# Patient Record
Sex: FEMALE | Race: OTHER | HISPANIC OR LATINO | Employment: UNEMPLOYED | URBAN - METROPOLITAN AREA
[De-identification: names, ages, dates, MRNs, and addresses within clinical notes are randomized per-mention and may not be internally consistent; named-entity substitution may affect disease eponyms.]

---

## 2021-08-19 ENCOUNTER — HISTORICAL (OUTPATIENT)
Dept: ADMINISTRATIVE | Facility: HOSPITAL | Age: 26
End: 2021-08-19

## 2021-08-19 LAB
ABS NEUT (OLG): 4.81 X10(3)/MCL (ref 2.1–9.2)
ALBUMIN SERPL-MCNC: 4.6 GM/DL (ref 3.5–5)
ALBUMIN/GLOB SERPL: 1.5 RATIO (ref 1.1–2)
ALP SERPL-CCNC: 162 UNIT/L (ref 40–150)
ALT SERPL-CCNC: 18 UNIT/L (ref 0–55)
AST SERPL-CCNC: 14 UNIT/L (ref 5–34)
BASOPHILS # BLD AUTO: 0 X10(3)/MCL (ref 0–0.2)
BASOPHILS NFR BLD AUTO: 0 %
BILIRUB SERPL-MCNC: 0.3 MG/DL
BILIRUBIN DIRECT+TOT PNL SERPL-MCNC: 0.1 MG/DL (ref 0–0.5)
BILIRUBIN DIRECT+TOT PNL SERPL-MCNC: 0.2 MG/DL (ref 0–0.8)
BUN SERPL-MCNC: 11.7 MG/DL (ref 7–18.7)
CALCIUM SERPL-MCNC: 10.6 MG/DL (ref 8.4–10.2)
CHLORIDE SERPL-SCNC: 103 MMOL/L (ref 98–107)
CO2 SERPL-SCNC: 26 MMOL/L (ref 22–29)
CREAT SERPL-MCNC: 0.61 MG/DL (ref 0.55–1.02)
CRP SERPL-MCNC: 0.23 MG/DL
EOSINOPHIL # BLD AUTO: 0.1 X10(3)/MCL (ref 0–0.9)
EOSINOPHIL NFR BLD AUTO: 1 %
ERYTHROCYTE [DISTWIDTH] IN BLOOD BY AUTOMATED COUNT: 13.7 % (ref 11.5–17)
ERYTHROCYTE [SEDIMENTATION RATE] IN BLOOD: 11 MM/HR (ref 0–20)
GLOBULIN SER-MCNC: 3 GM/DL (ref 2.4–3.5)
GLUCOSE SERPL-MCNC: 92 MG/DL (ref 74–100)
HCT VFR BLD AUTO: 39.2 % (ref 37–47)
HGB BLD-MCNC: 12.9 GM/DL (ref 12–16)
LYMPHOCYTES # BLD AUTO: 3.3 X10(3)/MCL (ref 0.6–4.6)
LYMPHOCYTES NFR BLD AUTO: 38 %
MCH RBC QN AUTO: 29.3 PG (ref 27–31)
MCHC RBC AUTO-ENTMCNC: 32.9 GM/DL (ref 33–36)
MCV RBC AUTO: 89.1 FL (ref 80–94)
MONOCYTES # BLD AUTO: 0.6 X10(3)/MCL (ref 0.1–1.3)
MONOCYTES NFR BLD AUTO: 6 %
NEUTROPHILS # BLD AUTO: 4.81 X10(3)/MCL (ref 2.1–9.2)
NEUTROPHILS NFR BLD AUTO: 54 %
PLATELET # BLD AUTO: 303 X10(3)/MCL (ref 130–400)
PMV BLD AUTO: 11.1 FL (ref 9.4–12.4)
POTASSIUM SERPL-SCNC: 3.8 MMOL/L (ref 3.5–5.1)
PROT SERPL-MCNC: 7.6 GM/DL (ref 6.4–8.3)
RBC # BLD AUTO: 4.4 X10(6)/MCL (ref 4.2–5.4)
SODIUM SERPL-SCNC: 141 MMOL/L (ref 136–145)
T3FREE SERPL-MCNC: 2.96 PG/ML (ref 1.58–3.91)
T4 SERPL-MCNC: 7.76 UG/DL (ref 4.87–11.72)
TSH SERPL-ACNC: 0.66 UIU/ML (ref 0.35–4.94)
WBC # SPEC AUTO: 8.9 X10(3)/MCL (ref 4.5–11.5)

## 2022-12-29 ENCOUNTER — TELEPHONE (OUTPATIENT)
Dept: OBSTETRICS AND GYNECOLOGY | Facility: CLINIC | Age: 27
End: 2022-12-29

## 2022-12-29 NOTE — TELEPHONE ENCOUNTER
Phone call returned.   I left a message on [employer]    I will attempt to reach tomorrow morning to schedule a New OB appointment

## 2022-12-29 NOTE — TELEPHONE ENCOUNTER
----- Message from Edel Carvajal sent at 12/29/2022  3:52 PM CST -----  Contact:  Jay Bourgeois  Patient  Jay Yelverton  called for a new pregnancy appt  for Elida Powers  please call [phone] / pt  is waiting for new insurance cards

## 2022-12-30 NOTE — TELEPHONE ENCOUNTER
Phone call returned.  I spoke to Mr Bourgeois.  He is in the middle of a conference call    I will attempt to reach him before noon today.

## 2023-01-03 ENCOUNTER — TELEPHONE (OUTPATIENT)
Dept: OBSTETRICS AND GYNECOLOGY | Facility: CLINIC | Age: 28
End: 2023-01-03

## 2023-01-03 NOTE — TELEPHONE ENCOUNTER
----- Message from Lisbeth Barnard LPN sent at 1/3/2023  2:21 PM CST -----  Contact: Satish ()    ----- Message -----  From: Sue Grimes MA  Sent: 1/3/2023  11:30 AM CST  To: Lisbeth Barnard LPN      ----- Message -----  From: Melissa Nolan  Sent: 1/3/2023  10:39 AM CST  To: Bj Gaytan III Staff    Type:  Patient Returning Call    Who Called:Satish (patient's )  Who Left Message for Patient: unsure   Does the patient know what this is regarding?:Yes new OB patient   Would the patient rather a call back or a response via [facility]? Call back   Best Call Back Number:[phone]   Additional Information: I tried to call clinic, but no answer, thanks~     (This is a duplicate chart, I sent ticket to help desk to merge charts, you may have gotten message on the [facility] [id number])

## 2023-01-03 NOTE — TELEPHONE ENCOUNTER
I have not been able to reach this patient's  by phone   I did schedule an OB Intake appointment for the patient

## 2023-01-03 NOTE — TELEPHONE ENCOUNTER
----- Message from Sue Grimes MA sent at 1/3/2023 11:30 AM CST -----  Contact: Satish ()    ----- Message -----  From: Melissa Nolan  Sent: 1/3/2023  10:39 AM CST  To: Bj Gaytan III Staff    Type:  Patient Returning Call    Who Called:Satish (patient's )  Who Left Message for Patient: unsure   Does the patient know what this is regarding?:Yes new OB patient   Would the patient rather a call back or a response via [facility]? Call back   Best Call Back Number:[phone]   Additional Information: I tried to call clinic, but no answer, thanks~     (This is a duplicate chart, I sent ticket to help desk to merge charts, you may have gotten message on the [facility] [id number])

## 2023-01-03 NOTE — TELEPHONE ENCOUNTER
Phone call returned to schedule a New OB Intake appointment.    No answer. I left a message on this voicemail  I will attempt to reach later today.

## 2023-01-04 ENCOUNTER — TELEPHONE (OUTPATIENT)
Dept: OBSTETRICS AND GYNECOLOGY | Facility: CLINIC | Age: 28
End: 2023-01-04

## 2023-01-04 NOTE — TELEPHONE ENCOUNTER
Spoke to patients , he states his wife was scheduled for tomorrow or a nurse visit for an ob intake and states they will be unable to make that appointment. Patient has been rescheduled.

## 2023-01-09 ENCOUNTER — TELEPHONE (OUTPATIENT)
Dept: OBSTETRICS AND GYNECOLOGY | Facility: CLINIC | Age: 28
End: 2023-01-09

## 2023-01-09 ENCOUNTER — CLINICAL SUPPORT (OUTPATIENT)
Dept: OBSTETRICS AND GYNECOLOGY | Facility: CLINIC | Age: 28
End: 2023-01-09

## 2023-01-09 VITALS — WEIGHT: 195 LBS | DIASTOLIC BLOOD PRESSURE: 70 MMHG | HEART RATE: 81 BPM | SYSTOLIC BLOOD PRESSURE: 112 MMHG

## 2023-01-09 DIAGNOSIS — Z3A.26 26 WEEKS GESTATION OF PREGNANCY: Primary | ICD-10-CM

## 2023-01-09 NOTE — PROGRESS NOTES
Transfer of Prenatal Care. Relocated from [place].  She does have [facility] but I am unable to retrieve her records at this time.   Prenatal Genetic testing reportedly is negative.  She denies any cramping, vaginal bleeding or loss of fluid.  She denies any complications in her pregnancy

## 2023-01-09 NOTE — TELEPHONE ENCOUNTER
----- Message from Gaby Mancuso sent at 1/9/2023  4:18 PM CST -----  Contact: self  Pt  called and asked if he can get a call back regarding a lab order. Please call [phone]

## 2023-01-09 NOTE — TELEPHONE ENCOUNTER
Phone call returned to Patient's .   No answer, I will attempt to reach in the morning.  Patient does not have a voicemail / Mychart set up.

## 2023-01-10 LAB
ABS NRBC COUNT: 0 X 10 3/UL (ref 0–0.01)
ABSOLUTE BASOPHIL: 0.02 X 10 3/UL (ref 0–0.22)
ABSOLUTE EOSINOPHIL: 0.11 X 10 3/UL (ref 0.04–0.54)
ABSOLUTE IMMATURE GRAN: 0.19 X 10 3/UL (ref 0–0.04)
ABSOLUTE LYMPHOCYTE: 2.45 X 10 3/UL (ref 0.86–4.75)
ABSOLUTE MONOCYTE: 0.59 X 10 3/UL (ref 0.22–1.08)
BASOPHILS NFR BLD: 0.2 % (ref 0.2–1.2)
EOSINOPHIL NFR BLD: 0.8 % (ref 0.7–7)
GLUCOSE 1 HR POST 50 GM: 109 MG/DL
HCT VFR BLD AUTO: 34.8 % (ref 37–47)
HGB BLD-MCNC: 11.7 G/DL (ref 12–16)
IMMATURE GRANULOCYTES: 1.5 % (ref 0–0.5)
LYMPHOCYTES NFR BLD: 18.9 % (ref 19.3–53.1)
MCH RBC QN AUTO: 30.2 PG (ref 27–32)
MCHC RBC AUTO-ENTMCNC: 33.6 G/DL (ref 32–36)
MCV RBC AUTO: 89.9 FL (ref 82–100)
MONOCYTES NFR BLD: 4.5 % (ref 4.7–12.5)
NEUTROPHILS # BLD AUTO: 9.62 X 10 3/UL (ref 2.15–7.56)
NEUTROPHILS NFR BLD: 74.1 % (ref 34–71.1)
NUCLEATED RED BLOOD CELLS: 0 /100 WBC (ref 0–0.2)
PLATELET # BLD AUTO: 269 X 10 3/UL (ref 135–400)
RBC # BLD AUTO: 3.87 X 10 6/UL (ref 4.2–5.4)
RDW-SD: 45.3 FL (ref 37–54)
WBC # BLD: 12.98 X 10 3/UL (ref 4.3–10.8)

## 2023-01-10 NOTE — TELEPHONE ENCOUNTER
Attempt to return Patient's 's call. No answer. I left a message on his voicemail and will attempt to reach later today.

## 2023-01-11 ENCOUNTER — ROUTINE PRENATAL (OUTPATIENT)
Dept: OBSTETRICS AND GYNECOLOGY | Facility: CLINIC | Age: 28
End: 2023-01-11

## 2023-01-11 ENCOUNTER — PROCEDURE VISIT (OUTPATIENT)
Dept: OBSTETRICS AND GYNECOLOGY | Facility: CLINIC | Age: 28
End: 2023-01-11

## 2023-01-11 VITALS — DIASTOLIC BLOOD PRESSURE: 70 MMHG | SYSTOLIC BLOOD PRESSURE: 117 MMHG | HEART RATE: 91 BPM

## 2023-01-11 DIAGNOSIS — Z3A.26 26 WEEKS GESTATION OF PREGNANCY: ICD-10-CM

## 2023-01-11 DIAGNOSIS — I51.5 CARDIAC CALCIFICATION: Primary | ICD-10-CM

## 2023-01-11 DIAGNOSIS — Z34.90 PREGNANCY, UNSPECIFIED GESTATIONAL AGE: Primary | ICD-10-CM

## 2023-01-11 PROCEDURE — 76805 US OB/GYN PROCEDURE (VIEWPOINT): ICD-10-PCS | Mod: S$GLB,,, | Performed by: OBSTETRICS & GYNECOLOGY

## 2023-01-11 PROCEDURE — 0502F SUBSEQUENT PRENATAL CARE: CPT | Mod: CPTII,S$GLB,, | Performed by: OBSTETRICS & GYNECOLOGY

## 2023-01-11 PROCEDURE — 76805 OB US >/= 14 WKS SNGL FETUS: CPT | Mod: S$GLB,,, | Performed by: OBSTETRICS & GYNECOLOGY

## 2023-01-11 PROCEDURE — 0502F PR SUBSEQUENT PRENATAL CARE: ICD-10-PCS | Mod: CPTII,S$GLB,, | Performed by: OBSTETRICS & GYNECOLOGY

## 2023-01-11 NOTE — PROGRESS NOTES
Referral to Pediatric Cardiologist Dr. Colt Nazario  Dx: embryonic cardiac calcification  Fax#  [phone]  Phone # [phone]

## 2023-01-11 NOTE — PROGRESS NOTES
27 y.o. female  at 25w6d   Reports + FM, denies VB, leaking of fluid, or cramping  Doing well   TWG:    Reviewed upcoming 28wk labs, and orders placed, tdap handout provided and explained  Reviewed warning signs, normal FM,  labor precautions and how/when to call.  RTC x 4 wks, call or present sooner prn.

## 2023-01-12 ENCOUNTER — PATIENT MESSAGE (OUTPATIENT)
Dept: OTHER | Facility: OTHER | Age: 28
End: 2023-01-12

## 2023-01-12 DIAGNOSIS — Z3A.26 26 WEEKS GESTATION OF PREGNANCY: Primary | ICD-10-CM

## 2023-01-26 ENCOUNTER — PATIENT MESSAGE (OUTPATIENT)
Dept: OTHER | Facility: OTHER | Age: 28
End: 2023-01-26

## 2023-02-08 ENCOUNTER — ROUTINE PRENATAL (OUTPATIENT)
Dept: OBSTETRICS AND GYNECOLOGY | Facility: CLINIC | Age: 28
End: 2023-02-08

## 2023-02-08 ENCOUNTER — PROCEDURE VISIT (OUTPATIENT)
Dept: OBSTETRICS AND GYNECOLOGY | Facility: CLINIC | Age: 28
End: 2023-02-08

## 2023-02-08 VITALS — DIASTOLIC BLOOD PRESSURE: 66 MMHG | WEIGHT: 203 LBS | HEART RATE: 81 BPM | SYSTOLIC BLOOD PRESSURE: 114 MMHG

## 2023-02-08 DIAGNOSIS — Z34.90 PREGNANCY, UNSPECIFIED GESTATIONAL AGE: Primary | ICD-10-CM

## 2023-02-08 DIAGNOSIS — Z3A.26 26 WEEKS GESTATION OF PREGNANCY: ICD-10-CM

## 2023-02-08 PROCEDURE — 76816 US OB/GYN PROCEDURE (VIEWPOINT): ICD-10-PCS | Mod: S$GLB,,, | Performed by: OBSTETRICS & GYNECOLOGY

## 2023-02-08 PROCEDURE — 0502F PR SUBSEQUENT PRENATAL CARE: ICD-10-PCS | Mod: S$GLB,,, | Performed by: OBSTETRICS & GYNECOLOGY

## 2023-02-08 PROCEDURE — 0502F SUBSEQUENT PRENATAL CARE: CPT | Mod: S$GLB,,, | Performed by: OBSTETRICS & GYNECOLOGY

## 2023-02-08 PROCEDURE — 76816 OB US FOLLOW-UP PER FETUS: CPT | Mod: S$GLB,,, | Performed by: OBSTETRICS & GYNECOLOGY

## 2023-02-08 NOTE — PROGRESS NOTES
27 y.o. female  at 29w6d   Reports normal fetal mvts, denies vaginal bleeding, Leaking fluid  or regular contractions  Doing well  TWwk labs discussed   Tdap  Reviewed warning signs, normal fetal movements,  labor precautions discussed  RTC x 2 wks, call or present sooner prn.   Covid vaccine discussed

## 2023-02-09 ENCOUNTER — PATIENT MESSAGE (OUTPATIENT)
Dept: OTHER | Facility: OTHER | Age: 28
End: 2023-02-09

## 2023-02-14 ENCOUNTER — TELEPHONE (OUTPATIENT)
Dept: OBSTETRICS AND GYNECOLOGY | Facility: CLINIC | Age: 28
End: 2023-02-14

## 2023-02-14 NOTE — TELEPHONE ENCOUNTER
----- Message from Sam Boyd LPN sent at 2/14/2023  4:16 PM CST -----  Regarding: ob question    ----- Message -----  From: Cierra Pantoja  Sent: 2/14/2023   9:28 AM CST  To: Bj Gaytan III Staff    Patient would like a call back in regards to iron medication questions..Please call back [phone]

## 2023-02-14 NOTE — TELEPHONE ENCOUNTER
Phone call returned  forgot what strength of  Iron supplement needed for anemia.  Slow Fe has 45 mg take daily   Opposite of prenatal vitamins.

## 2023-02-16 ENCOUNTER — TELEPHONE (OUTPATIENT)
Dept: OBSTETRICS AND GYNECOLOGY | Facility: CLINIC | Age: 28
End: 2023-02-16

## 2023-02-16 NOTE — TELEPHONE ENCOUNTER
----- Message from Khang Kan sent at 2/16/2023  4:02 PM CST -----  Contact: self  Patient requesting a call back regarding if the appt for 02/23 would be a good date to come back in to you. He also stated that the young lady at the  at there last appt wasn't very helpful in the process of getting them a new appt scheduled.  Please call back at [phone].

## 2023-02-23 ENCOUNTER — PATIENT MESSAGE (OUTPATIENT)
Dept: OTHER | Facility: OTHER | Age: 28
End: 2023-02-23

## 2023-02-23 ENCOUNTER — ROUTINE PRENATAL (OUTPATIENT)
Dept: OBSTETRICS AND GYNECOLOGY | Facility: CLINIC | Age: 28
End: 2023-02-23

## 2023-02-23 VITALS — HEART RATE: 85 BPM | WEIGHT: 206 LBS | SYSTOLIC BLOOD PRESSURE: 122 MMHG | DIASTOLIC BLOOD PRESSURE: 75 MMHG

## 2023-02-23 DIAGNOSIS — Z34.90 PREGNANCY, UNSPECIFIED GESTATIONAL AGE: Primary | ICD-10-CM

## 2023-02-23 PROCEDURE — 90471 TDAP VACCINE GREATER THAN OR EQUAL TO 7YO IM: ICD-10-PCS | Mod: S$GLB,,, | Performed by: OBSTETRICS & GYNECOLOGY

## 2023-02-23 PROCEDURE — 90715 TDAP VACCINE 7 YRS/> IM: CPT | Mod: S$GLB,,, | Performed by: OBSTETRICS & GYNECOLOGY

## 2023-02-23 PROCEDURE — 0502F SUBSEQUENT PRENATAL CARE: CPT | Mod: CPTII,S$GLB,, | Performed by: OBSTETRICS & GYNECOLOGY

## 2023-02-23 PROCEDURE — 0502F PR SUBSEQUENT PRENATAL CARE: ICD-10-PCS | Mod: CPTII,S$GLB,, | Performed by: OBSTETRICS & GYNECOLOGY

## 2023-02-23 PROCEDURE — 90715 TDAP VACCINE GREATER THAN OR EQUAL TO 7YO IM: ICD-10-PCS | Mod: S$GLB,,, | Performed by: OBSTETRICS & GYNECOLOGY

## 2023-02-23 PROCEDURE — 90471 IMMUNIZATION ADMIN: CPT | Mod: S$GLB,,, | Performed by: OBSTETRICS & GYNECOLOGY

## 2023-02-23 NOTE — PROGRESS NOTES
27 y.o. female  at 32w0d   Reports normal fetal mvts, denies vaginal bleeding, Leaking fluid  or regular contractions  Doing well  TW  Tdap and covid vaccines discussed   Reviewed warning signs, normal fetal movements,  labor precautions discussed in detail   RTC x 2 wks, call or present sooner prn.   Good Mvts    Desires the TDAP  LPs   Doing well

## 2023-03-16 ENCOUNTER — PATIENT MESSAGE (OUTPATIENT)
Dept: OTHER | Facility: OTHER | Age: 28
End: 2023-03-16
Payer: COMMERCIAL

## 2023-03-16 ENCOUNTER — ROUTINE PRENATAL (OUTPATIENT)
Dept: OBSTETRICS AND GYNECOLOGY | Facility: CLINIC | Age: 28
End: 2023-03-16
Payer: COMMERCIAL

## 2023-03-16 VITALS — SYSTOLIC BLOOD PRESSURE: 130 MMHG | HEART RATE: 80 BPM | DIASTOLIC BLOOD PRESSURE: 75 MMHG | WEIGHT: 202 LBS

## 2023-03-16 DIAGNOSIS — Z34.90 PREGNANCY, UNSPECIFIED GESTATIONAL AGE: Primary | ICD-10-CM

## 2023-03-16 PROCEDURE — 0502F SUBSEQUENT PRENATAL CARE: CPT | Mod: CPTII,S$GLB,, | Performed by: OBSTETRICS & GYNECOLOGY

## 2023-03-16 PROCEDURE — 0502F PR SUBSEQUENT PRENATAL CARE: ICD-10-PCS | Mod: CPTII,S$GLB,, | Performed by: OBSTETRICS & GYNECOLOGY

## 2023-03-16 NOTE — PROGRESS NOTES
27 y.o. female  at 35w0d  Reports + FM, denies VB, Leaking or regular CTX  Doing well   TW  GBS collected    Reviewed warning signs, normal fetal movements, labor precautions discussed in detail   RTC 1 wks, or sooner prn. Labor and delivery PRN   Chaperone was present for pelvic exam

## 2023-03-16 NOTE — PROGRESS NOTES
27 y.o. female  at 35w4d  Reports + FM, denies VB, Leaking or regular CTX  Doing well  TW  GBS collected    Reviewed warning signs, normal fetal movements, labor precautions discussed in detail   RTC 1 wks, or sooner prn. Labor and delivery PRN   Chaperone for pelvic exam present

## 2023-03-17 LAB
GROUP B STREP MOLECULAR: NEGATIVE
PENICILLIN ALLERGIC: NO

## 2023-03-23 ENCOUNTER — ROUTINE PRENATAL (OUTPATIENT)
Dept: OBSTETRICS AND GYNECOLOGY | Facility: CLINIC | Age: 28
End: 2023-03-23
Payer: COMMERCIAL

## 2023-03-23 VITALS — WEIGHT: 208 LBS | SYSTOLIC BLOOD PRESSURE: 126 MMHG | HEART RATE: 86 BPM | DIASTOLIC BLOOD PRESSURE: 70 MMHG

## 2023-03-23 DIAGNOSIS — Z34.90 PREGNANCY, UNSPECIFIED GESTATIONAL AGE: Primary | ICD-10-CM

## 2023-03-23 PROCEDURE — 0502F PR SUBSEQUENT PRENATAL CARE: ICD-10-PCS | Mod: CPTII,S$GLB,,

## 2023-03-23 PROCEDURE — 0502F SUBSEQUENT PRENATAL CARE: CPT | Mod: CPTII,S$GLB,,

## 2023-03-23 NOTE — PROGRESS NOTES
27 y.o. female  at 36w0d  Reports + FM today but has been decreased lately, denies VB, Leaking or regular CTX  Doing well   TW  Reviewed warning signs, normal fetal movements, labor precautions discussed in detail   RTC 1 wks, or sooner prn. Labor and delivery PRN   Chaperone was present for pelvic exam    Instructed to go to L&D for fetal monitoring

## 2023-03-30 ENCOUNTER — ROUTINE PRENATAL (OUTPATIENT)
Dept: OBSTETRICS AND GYNECOLOGY | Facility: CLINIC | Age: 28
End: 2023-03-30
Payer: COMMERCIAL

## 2023-03-30 VITALS — SYSTOLIC BLOOD PRESSURE: 128 MMHG | DIASTOLIC BLOOD PRESSURE: 74 MMHG | WEIGHT: 210 LBS | HEART RATE: 73 BPM

## 2023-03-30 DIAGNOSIS — Z34.90 PREGNANCY, UNSPECIFIED GESTATIONAL AGE: Primary | ICD-10-CM

## 2023-03-30 PROCEDURE — 0502F SUBSEQUENT PRENATAL CARE: CPT | Mod: CPTII,S$GLB,, | Performed by: OBSTETRICS & GYNECOLOGY

## 2023-03-30 PROCEDURE — 0502F PR SUBSEQUENT PRENATAL CARE: ICD-10-PCS | Mod: CPTII,S$GLB,, | Performed by: OBSTETRICS & GYNECOLOGY

## 2023-03-30 NOTE — PROGRESS NOTES
27 y.o. female  at 37w0d  Reports + FM, denies VB, Leaking or regular CTX  Doing well without   TW  GBS collected    Reviewed warning signs, normal fetal movements, labor precautions discussed in detail   RTC 1 wks, or sooner prn. Labor and delivery PRN   Chaperone was present for pelvic exam

## 2023-04-06 ENCOUNTER — ROUTINE PRENATAL (OUTPATIENT)
Dept: OBSTETRICS AND GYNECOLOGY | Facility: CLINIC | Age: 28
End: 2023-04-06
Payer: COMMERCIAL

## 2023-04-06 VITALS — HEART RATE: 79 BPM | DIASTOLIC BLOOD PRESSURE: 75 MMHG | SYSTOLIC BLOOD PRESSURE: 127 MMHG | WEIGHT: 210 LBS

## 2023-04-06 DIAGNOSIS — Z34.90 PREGNANCY, UNSPECIFIED GESTATIONAL AGE: Primary | ICD-10-CM

## 2023-04-06 PROCEDURE — 0502F SUBSEQUENT PRENATAL CARE: CPT | Mod: CPTII,S$GLB,, | Performed by: OBSTETRICS & GYNECOLOGY

## 2023-04-06 PROCEDURE — 0502F PR SUBSEQUENT PRENATAL CARE: ICD-10-PCS | Mod: CPTII,S$GLB,, | Performed by: OBSTETRICS & GYNECOLOGY

## 2023-04-06 NOTE — PROGRESS NOTES
27 y.o. female  at 38w0d  Reports + FM, denies VB, Leaking or regular CTX  Doing well without   TW   GBS collected    Reviewed warning signs, normal fetal movements, labor precautions discussed in detail   RTC 1 wks, or sooner prn. Labor and delivery PRN   Chaperone was present for pelvic exam

## 2023-04-14 ENCOUNTER — OUTSIDE PLACE OF SERVICE (OUTPATIENT)
Dept: OBSTETRICS AND GYNECOLOGY | Facility: CLINIC | Age: 28
End: 2023-04-14
Payer: COMMERCIAL

## 2023-04-14 PROCEDURE — 59400 PR FULL ROUT OBSTE CARE,VAGINAL DELIV: ICD-10-PCS | Mod: GB,,, | Performed by: OBSTETRICS & GYNECOLOGY

## 2023-04-14 PROCEDURE — 59400 OBSTETRICAL CARE: CPT | Mod: GB,,, | Performed by: OBSTETRICS & GYNECOLOGY

## 2023-05-17 ENCOUNTER — PATIENT MESSAGE (OUTPATIENT)
Dept: OBSTETRICS AND GYNECOLOGY | Facility: CLINIC | Age: 28
End: 2023-05-17
Payer: COMMERCIAL

## 2023-05-29 ENCOUNTER — OFFICE VISIT (OUTPATIENT)
Dept: OBSTETRICS AND GYNECOLOGY | Facility: CLINIC | Age: 28
End: 2023-05-29
Payer: COMMERCIAL

## 2023-05-29 VITALS — SYSTOLIC BLOOD PRESSURE: 137 MMHG | HEART RATE: 90 BPM | WEIGHT: 189 LBS | DIASTOLIC BLOOD PRESSURE: 88 MMHG

## 2023-05-29 DIAGNOSIS — K64.8 OTHER HEMORRHOIDS: ICD-10-CM

## 2023-05-29 PROCEDURE — 0503F POSTPARTUM CARE VISIT: CPT | Mod: CPTII,S$GLB,, | Performed by: OBSTETRICS & GYNECOLOGY

## 2023-05-29 PROCEDURE — 1159F MED LIST DOCD IN RCRD: CPT | Mod: CPTII,S$GLB,, | Performed by: OBSTETRICS & GYNECOLOGY

## 2023-05-29 PROCEDURE — 1159F PR MEDICATION LIST DOCUMENTED IN MEDICAL RECORD: ICD-10-PCS | Mod: CPTII,S$GLB,, | Performed by: OBSTETRICS & GYNECOLOGY

## 2023-05-29 PROCEDURE — 3075F PR MOST RECENT SYSTOLIC BLOOD PRESS GE 130-139MM HG: ICD-10-PCS | Mod: CPTII,S$GLB,, | Performed by: OBSTETRICS & GYNECOLOGY

## 2023-05-29 PROCEDURE — 3075F SYST BP GE 130 - 139MM HG: CPT | Mod: CPTII,S$GLB,, | Performed by: OBSTETRICS & GYNECOLOGY

## 2023-05-29 PROCEDURE — 3079F PR MOST RECENT DIASTOLIC BLOOD PRESSURE 80-89 MM HG: ICD-10-PCS | Mod: CPTII,S$GLB,, | Performed by: OBSTETRICS & GYNECOLOGY

## 2023-05-29 PROCEDURE — 3079F DIAST BP 80-89 MM HG: CPT | Mod: CPTII,S$GLB,, | Performed by: OBSTETRICS & GYNECOLOGY

## 2023-05-29 PROCEDURE — 0503F PR POSTPARTUM CARE VISIT: ICD-10-PCS | Mod: CPTII,S$GLB,, | Performed by: OBSTETRICS & GYNECOLOGY

## 2023-05-29 RX ORDER — HYDROCORTISONE 25 MG/G
CREAM TOPICAL 2 TIMES DAILY
Qty: 20 G | Refills: 1 | Status: SHIPPED | OUTPATIENT
Start: 2023-05-29

## 2023-05-29 NOTE — PROGRESS NOTES
Subjective:       Patient ID: Elida Powers is a 27 y.o. female.    Chief Complaint:  Postpartum Care (6 WEEKS POST PARTUM. PATIENT C/O HEMORRHOIDS. )      History of Present Illness  She is doing well.  No new health issues,  No abnormal bleeding, No GI or Gu concerns,  No postpartum blues or depression.   Birth control discussed.  She has some hemorrhoids     she is nursing and doing well .   HPI      GYN & OB History  No LMP recorded.     Date of Last Pap: No result found    OB History    Para Term  AB Living   2 1 1         SAB IAB Ectopic Multiple Live Births                  # Outcome Date GA Lbr Lauri/2nd Weight Sex Delivery Anes PTL Lv   2             1 Term 20 40w0d  3.43 kg (7 lb 9 oz) M  EPI N        Review of Systems  Review of Systems   Constitutional:  Negative for activity change.   Eyes:  Negative for visual disturbance.   Respiratory:  Negative for shortness of breath.    Cardiovascular:  Negative for chest pain.   Gastrointestinal:  Negative for abdominal pain.   Genitourinary:  Negative for vaginal bleeding.        No abnormal vaginal bleeding   Musculoskeletal:  Negative for back pain.   Integumentary:  Negative for rash and breast mass.   Neurological:  Negative for numbness.   Psychiatric/Behavioral:          No mood disturbance or changes    Breast: Negative for mass          Objective:    Physical Exam:   Constitutional: She appears well-developed and well-nourished.             Abdominal: Soft.     Genitourinary:    Vagina and uterus normal.   No tenderness or bleeding in the vagina.                 Psychiatric: She has a normal mood and affect. Her behavior is normal.        Assessment:        1. 6 weeks postpartum follow-up                 Plan:         Discussed the mini pill    Her last pap smear is discussed she is to return for her annual and pap.    Birth control discussed   All questions answered  Chaperone was present

## 2023-07-18 ENCOUNTER — TELEPHONE (OUTPATIENT)
Dept: OBSTETRICS AND GYNECOLOGY | Facility: CLINIC | Age: 28
End: 2023-07-18
Payer: COMMERCIAL

## 2023-07-18 NOTE — TELEPHONE ENCOUNTER
----- Message from Wilda Cano sent at 7/18/2023  1:29 PM CDT -----  Contact: pt  Pt  Jay calling with question about script and he can be reached at [phone]    Thanks,

## 2023-07-18 NOTE — TELEPHONE ENCOUNTER
Spoke with pt's . He was inquiring about the birth control that Dr. Lorenzo had recommended to them. He stated that Dr. Lorenzo wrote the name on a prescription paper for them, but did not date or sign it. His wife was wanting to look up some information on the birth control before starting it. He confirmed that she is breastfeeding. It is the Gin mini pill that Dr. Lorenzo had discussed. The  understood, said they would do their research, and if his wife decides to start the medicine they will let us know so that we can get it sent to the pharmacy.

## 2024-03-26 ENCOUNTER — OFFICE VISIT (OUTPATIENT)
Dept: OBSTETRICS AND GYNECOLOGY | Facility: CLINIC | Age: 29
End: 2024-03-26
Payer: COMMERCIAL

## 2024-03-26 VITALS
HEIGHT: 65 IN | HEART RATE: 72 BPM | BODY MASS INDEX: 31.49 KG/M2 | DIASTOLIC BLOOD PRESSURE: 72 MMHG | SYSTOLIC BLOOD PRESSURE: 114 MMHG | WEIGHT: 189 LBS

## 2024-03-26 DIAGNOSIS — F41.9 ANXIETY AND DEPRESSION: ICD-10-CM

## 2024-03-26 DIAGNOSIS — F32.A ANXIETY AND DEPRESSION: ICD-10-CM

## 2024-03-26 DIAGNOSIS — Z30.9 ENCOUNTER FOR CONTRACEPTIVE MANAGEMENT, UNSPECIFIED TYPE: Primary | ICD-10-CM

## 2024-03-26 DIAGNOSIS — Z12.4 SCREENING FOR MALIGNANT NEOPLASM OF CERVIX: Primary | ICD-10-CM

## 2024-03-26 PROCEDURE — 1159F MED LIST DOCD IN RCRD: CPT | Mod: CPTII,S$GLB,, | Performed by: OBSTETRICS & GYNECOLOGY

## 2024-03-26 PROCEDURE — 3008F BODY MASS INDEX DOCD: CPT | Mod: CPTII,S$GLB,, | Performed by: OBSTETRICS & GYNECOLOGY

## 2024-03-26 PROCEDURE — 3078F DIAST BP <80 MM HG: CPT | Mod: CPTII,S$GLB,, | Performed by: OBSTETRICS & GYNECOLOGY

## 2024-03-26 PROCEDURE — 3074F SYST BP LT 130 MM HG: CPT | Mod: CPTII,S$GLB,, | Performed by: OBSTETRICS & GYNECOLOGY

## 2024-03-26 PROCEDURE — 99395 PREV VISIT EST AGE 18-39: CPT | Mod: S$GLB,,, | Performed by: OBSTETRICS & GYNECOLOGY

## 2024-03-26 RX ORDER — SERTRALINE HYDROCHLORIDE 50 MG/1
TABLET, FILM COATED ORAL
Qty: 30 TABLET | Refills: 12 | Status: SHIPPED | OUTPATIENT
Start: 2024-03-26

## 2024-03-26 NOTE — PROGRESS NOTES
Subjective:       Patient ID: Elida Powers is a 28 y.o. female.    Chief Complaint:  Well Woman      History of Present Illness  She is doing well.  No new health issues,  No abnormal bleeding, No GI or Gu concerns,   She is feeling sad at times  no SI or HI   she is interested in an IUD   she has not had intercourse since the baby. She is still nursing.  Her  is with her   She is with drawn at times.   Lac of interested  decrease sexual desire   and feels down at times   .   HPI      GYN & OB History  No LMP recorded (lmp unknown).     Date of Last Pap: No result found    OB History    Para Term  AB Living   2 1 1         SAB IAB Ectopic Multiple Live Births                  # Outcome Date GA Lbr Lauri/2nd Weight Sex Delivery Anes PTL Lv   2             1 Term 20 40w0d  3.43 kg (7 lb 9 oz) M  EPI N        Review of Systems  Review of Systems   Constitutional:  Negative for activity change.   Eyes:  Negative for visual disturbance.   Respiratory:  Negative for shortness of breath.    Cardiovascular:  Negative for chest pain.   Gastrointestinal:  Negative for abdominal pain.   Genitourinary:  Negative for vaginal bleeding.        No abnormal vaginal bleeding   Musculoskeletal:  Negative for back pain.   Integumentary:  Negative for rash and breast mass.   Neurological:  Negative for numbness.   Psychiatric/Behavioral:  Positive for depression.         No mood disturbance or changes    Breast: Negative for mass          Objective:    Physical Exam:   Constitutional: She is oriented to person, place, and time. She appears well-developed. She is cooperative.    HENT:   Head: Normocephalic.     Neck: Trachea normal. No thyromegaly present.    Cardiovascular:  Normal rate, regular rhythm and normal heart sounds.             Pulmonary/Chest: Effort normal and breath sounds normal. Right breast exhibits no mass, no nipple discharge and no skin change. Left breast exhibits no  mass, no nipple discharge and no skin change.        Abdominal: Soft. There is no abdominal tenderness. There is no rebound and no guarding.     Genitourinary:    Vagina and uterus normal.      Pelvic exam was performed with patient supine.     Labial bartholins normal.There is no lesion on the right labia. There is no lesion on the left labia. Cervix is normal. Right adnexum displays no mass and no tenderness. Left adnexum displays no mass and no tenderness. Cervix exhibits no discharge. Uterus is not enlarged and not tender.              Lymphadenopathy:        Head (right side): No submental and no submandibular adenopathy present.        Head (left side): No submental and no submandibular adenopathy present.     She has no cervical adenopathy.    Neurological: She is alert and oriented to person, place, and time.    Skin: Skin is warm.    Psychiatric: She has a normal mood and affect. Her speech is normal and behavior is normal. Thought content normal.        Assessment:        1. Screening for malignant neoplasm of cervix                 Plan:         Discussed an IUD   adrielinna   will order   also will try zoloft     1/2 po q day    Pap discussed will return for her annual     Pt is aware we call all results. If she does not hear from our office regarding her result within a week of having a study or procedure performed she is to call the office so that we can research the result for her.  Birth control discussed  Chaperone was present

## 2024-03-30 LAB — Lab: NORMAL

## 2024-04-03 ENCOUNTER — PROCEDURE VISIT (OUTPATIENT)
Dept: OBSTETRICS AND GYNECOLOGY | Facility: CLINIC | Age: 29
End: 2024-04-03
Payer: COMMERCIAL

## 2024-04-03 VITALS
WEIGHT: 187 LBS | DIASTOLIC BLOOD PRESSURE: 68 MMHG | HEART RATE: 72 BPM | SYSTOLIC BLOOD PRESSURE: 112 MMHG | BODY MASS INDEX: 31.16 KG/M2 | HEIGHT: 65 IN

## 2024-04-03 DIAGNOSIS — Z30.430 ENCOUNTER FOR IUD INSERTION: Primary | ICD-10-CM

## 2024-04-03 PROCEDURE — 58300 INSERT INTRAUTERINE DEVICE: CPT | Mod: S$GLB,,, | Performed by: OBSTETRICS & GYNECOLOGY

## 2024-04-03 NOTE — PROCEDURES
Insertion of IUD    Date/Time: 4/3/2024 2:30 PM    Performed by: Lukas Lorenzo III, MD  Authorized by: Lukas Lorenzo III, MD    Consent:     Procedure risks and benefits discussed: yes      Patient questions answered: yes     Device to be inserted was verified by patient: yes    Educational handouts given: yes    Insertion Procedure:   18.6 mcg levonorgestreL 20.4 mcg/24 hrs (8 yrs) 52 mg       Pelvic exam performed: yes      Negative urine pregnancy test: yes      Cervix cleaned and prepped: yes      Speculum placed in vagina: yes      Tenaculum applied to cervix: yes      Uterus sounded: yes      Uterus sound depth (cm):  8    IUD inserted with no complications: yes      IUD type:  Liletta    Strings trimmed: yes    Post-procedure:     Patient tolerated procedure well: yes      Patient will follow up after next period: yes

## 2024-05-16 ENCOUNTER — OFFICE VISIT (OUTPATIENT)
Dept: OBSTETRICS AND GYNECOLOGY | Facility: CLINIC | Age: 29
End: 2024-05-16
Payer: COMMERCIAL

## 2024-05-16 VITALS
WEIGHT: 195 LBS | BODY MASS INDEX: 32.45 KG/M2 | SYSTOLIC BLOOD PRESSURE: 125 MMHG | DIASTOLIC BLOOD PRESSURE: 76 MMHG | HEART RATE: 78 BPM

## 2024-05-16 DIAGNOSIS — R74.8 ELEVATED ALKALINE PHOSPHATASE LEVEL: ICD-10-CM

## 2024-05-16 DIAGNOSIS — Z30.431 IUD CHECK UP: Primary | ICD-10-CM

## 2024-05-16 PROCEDURE — 99213 OFFICE O/P EST LOW 20 MIN: CPT | Mod: S$GLB,,, | Performed by: OBSTETRICS & GYNECOLOGY

## 2024-05-16 PROCEDURE — 3074F SYST BP LT 130 MM HG: CPT | Mod: CPTII,S$GLB,, | Performed by: OBSTETRICS & GYNECOLOGY

## 2024-05-16 PROCEDURE — 3008F BODY MASS INDEX DOCD: CPT | Mod: CPTII,S$GLB,, | Performed by: OBSTETRICS & GYNECOLOGY

## 2024-05-16 PROCEDURE — 3078F DIAST BP <80 MM HG: CPT | Mod: CPTII,S$GLB,, | Performed by: OBSTETRICS & GYNECOLOGY

## 2024-05-16 PROCEDURE — 1159F MED LIST DOCD IN RCRD: CPT | Mod: CPTII,S$GLB,, | Performed by: OBSTETRICS & GYNECOLOGY

## 2024-05-16 NOTE — PROGRESS NOTES
Subjective:       Patient ID: Elida Powers is a 28 y.o. female.    Chief Complaint:  Contraception (IUD CHECK. PATIENT C/O EXPERIENCING SOME BLOATING.  )      History of Present Illness  She is here to evaluate her IUD   she has no concerns  but she had some labs and a few were elevated   she would like to re check      GYN & OB History  No LMP recorded. (Menstrual status: Other).     Date of Last Pap: No result found    OB History    Para Term  AB Living   2 1 1     2   SAB IAB Ectopic Multiple Live Births                  # Outcome Date GA Lbr Lauri/2nd Weight Sex Type Anes PTL Lv   2 Term 20 40w0d  3.43 kg (7 lb 9 oz) M  EPI N    1                 Review of Systems  Review of Systems          Objective:    Physical Exam:               Genitourinary:    Vagina, right adnexa and left adnexa normal.   Cervix is normal. IUD strings visualized.                     Assessment:      No diagnosis found.             Plan:             Pap discussed will return for her annual     Pt is aware we call all results. If she does not hear from our office regarding her result within a week of having a study or procedure performed she is to call the office so that we can research the result for her.  Discussed follow up.  She is to rtn if her symptoms do not resolve or if persist  Chaperone was present

## 2024-06-10 ENCOUNTER — PATIENT MESSAGE (OUTPATIENT)
Dept: OBSTETRICS AND GYNECOLOGY | Facility: CLINIC | Age: 29
End: 2024-06-10
Payer: COMMERCIAL

## 2024-06-17 LAB
ALP ISOS SERPL LEV INH-CCNC: 135 U/L (ref 35–105)
ANION GAP SERPL CALC-SCNC: 12 MMOL/L (ref 8–17)
BUN/CREAT SERPL: 17.3 (ref 6–20)
CALCIUM SERPL-MCNC: 9.9 MG/DL (ref 8.6–10.2)
CARBON DIOXIDE, CO2: 27 MMOL/L (ref 22–29)
CHLORIDE: 104 MMOL/L (ref 98–107)
CREAT SERPL-MCNC: 0.77 MG/DL (ref 0.5–0.9)
CRP QUALITATIVE: NEGATIVE MG/L
CRP QUANTITATIVE: 3.8 MG/L
GFR ESTIMATION: 109.04 ML/MIN/1.73M2
GLUCOSE: 87 MG/DL (ref 74–106)
POTASSIUM: 4.7 MMOL/L (ref 3.5–5.1)
SODIUM: 143 MMOL/L (ref 136–145)
UREA NITROGEN (BUN): 13.3 MG/DL (ref 6–20)

## 2024-06-18 DIAGNOSIS — R74.8 ELEVATED ALKALINE PHOSPHATASE LEVEL: Primary | ICD-10-CM

## 2024-10-10 ENCOUNTER — TELEPHONE (OUTPATIENT)
Dept: OBSTETRICS AND GYNECOLOGY | Facility: CLINIC | Age: 29
End: 2024-10-10
Payer: COMMERCIAL

## 2024-10-10 NOTE — TELEPHONE ENCOUNTER
----- Message from Doreen sent at 10/9/2024  3:40 PM CDT -----  Contact: CONCHITA (Spouse)  Patient is requesting a call back regarding need lab orders . Please call back at [phone]

## 2024-10-10 NOTE — TELEPHONE ENCOUNTER
Spoke to patients . He states patient was needing lab orders sent to path lab for a follow up test. Orders faxed.

## 2025-02-17 ENCOUNTER — OFFICE VISIT (OUTPATIENT)
Dept: OBSTETRICS AND GYNECOLOGY | Facility: CLINIC | Age: 30
End: 2025-02-17
Payer: COMMERCIAL

## 2025-02-17 VITALS
DIASTOLIC BLOOD PRESSURE: 79 MMHG | WEIGHT: 208 LBS | HEIGHT: 65 IN | HEART RATE: 62 BPM | BODY MASS INDEX: 34.66 KG/M2 | SYSTOLIC BLOOD PRESSURE: 128 MMHG

## 2025-02-17 DIAGNOSIS — K60.2 ANAL FISSURE: Primary | ICD-10-CM

## 2025-02-17 RX ORDER — TIRZEPATIDE 5 MG/.5ML
INJECTION, SOLUTION SUBCUTANEOUS
Qty: 4 PEN | Refills: 12 | Status: SHIPPED | OUTPATIENT
Start: 2025-02-17 | End: 2025-02-17

## 2025-02-17 RX ORDER — NIFEDIPINE 10 MG/1
CAPSULE ORAL
Qty: 10 CAPSULE | Refills: 1 | Status: SHIPPED | OUTPATIENT
Start: 2025-02-17

## 2025-02-17 NOTE — PROGRESS NOTES
Subjective:       Patient ID: Elida Powers is a 29 y.o. female.    Chief Complaint:  Vaginal Pain      History of Present Illness  She is here to evaluate . An area around her anus on the perineum.   She does admit to pain everywhere at times  she has noted some weight gained weight   her  does not want her to take anything.       GYN & OB History  No LMP recorded. Patient has had an implant.     Date of Last Pap: 3/30/2024    OB History    Para Term  AB Living   2 1 1   2   SAB IAB Ectopic Multiple Live Births             # Outcome Date GA Lbr Lauri/2nd Weight Sex Type Anes PTL Lv   2 Term 20 40w0d  3.43 kg (7 lb 9 oz) M  EPI N    1                 Review of Systems  Review of Systems   Constitutional:  Negative for activity change.   Eyes:  Negative for visual disturbance.   Respiratory:  Negative for shortness of breath.    Cardiovascular:  Negative for chest pain.   Gastrointestinal:  Negative for abdominal pain.   Genitourinary:  Negative for vaginal bleeding.        No abnormal vaginal bleeding   Musculoskeletal:  Negative for back pain.   Integumentary:  Negative for rash and breast mass.   Neurological:  Negative for numbness.   Psychiatric/Behavioral:          No mood disturbance or changes    Breast: Negative for mass          Objective:    Physical Exam:               Genitourinary:    Vagina, right adnexa and left adnexa normal.   Rectum:      Fissure: 12:00.            Pelvic exam was performed with patient supine.   Cervix is normal.    Genitourinary Comments: Anal fissue                        Assessment:      No diagnosis found.             Plan:             Pap discussed will return for her annual     Pt is aware we call all results. If she does not hear from our office regarding her result within a week of having a study or procedure performed she is to call the office so that we can research the result for her.  Discussed follow up.  She is to rtn if  her symptoms do not resolve or if persist  Chaperone was present

## 2025-04-28 DIAGNOSIS — F41.9 ANXIETY AND DEPRESSION: ICD-10-CM

## 2025-04-28 DIAGNOSIS — F32.A ANXIETY AND DEPRESSION: ICD-10-CM

## 2025-04-28 RX ORDER — SERTRALINE HYDROCHLORIDE 50 MG/1
TABLET, FILM COATED ORAL
Qty: 30 TABLET | Refills: 1 | Status: SHIPPED | OUTPATIENT
Start: 2025-04-28

## 2025-04-28 NOTE — TELEPHONE ENCOUNTER
----- Message from Soraida sent at 4/28/2025  9:44 AM CDT -----  Type:  RX Refill RequestWho Called: Elida SIMEON Cricket AntunezRefill or New Rx: refill RX Name and Strength:sertraline (ZOLOFT) 50 MG tabletHow is the patient currently taking it? (ex. 1XDay):1/2 po q day for 6 days then one po every dayIs this a 30 day or 90 day RX:30Preferred Pharmacy with phone number:[facility] @ [phone] or Mail Order:Local Ordering Provider:Bj Would the patient rather a call back or a response via [facility]?  Best Call Back Number:[phone] Information: -  ----- Message -----  From: Soraida Orozco  Sent: 4/28/2025   9:50 AM CDT  To: Bj Gaytan III Staff    Type:  RX Refill RequestWho Called: Elida Marinada AntunezRefill or New Rx: refill RX Name and Strength:sertraline (ZOLOFT) 50 MG tabletHow is the patient currently taking it? (ex. 1XDay):1Xday Is this a 30 day or 90 day RX:30Preferred Pharmacy with phone number:[facility] @ [phone] or Mail Order:Local Ordering Provider:Bj Would the patient rather a call back or a response via [facility]?  Best Call Back Number:[phone] Information: -

## 2025-04-28 NOTE — TELEPHONE ENCOUNTER
----- Message from Soraida sent at 4/28/2025  9:44 AM CDT -----  Type:  RX Refill RequestWho Called: Elida SIMEON Cricket AntunezRefill or New Rx: refill RX Name and Strength:sertraline (ZOLOFT) 50 MG tabletHow is the patient currently taking it? (ex. 1XDay):1/2 po q day for 6 days then one po every dayIs this a 30 day or 90 day RX:30Preferred Pharmacy with phone number:[facility] @ [phone] or Mail Order:Local Ordering Provider:Bj Would the patient rather a call back or a response via [employer]?  Best Call Back Number:[phone] Information: -  ----- Message -----  From: Soraida Orozco  Sent: 4/28/2025   9:50 AM CDT  To: Bj Gaytan III Staff    Type:  RX Refill RequestWho Called: Elida Marinada AntunezRefill or New Rx: refill RX Name and Strength:sertraline (ZOLOFT) 50 MG tabletHow is the patient currently taking it? (ex. 1XDay):1Xday Is this a 30 day or 90 day RX:30Preferred Pharmacy with phone number:[facility] @ [phone] or Mail Order:Local Ordering Provider:Bj Would the patient rather a call back or a response via [facility]?  Best Call Back Number:[phone] Information: -